# Patient Record
Sex: FEMALE | Race: WHITE | NOT HISPANIC OR LATINO | Employment: FULL TIME | URBAN - METROPOLITAN AREA
[De-identification: names, ages, dates, MRNs, and addresses within clinical notes are randomized per-mention and may not be internally consistent; named-entity substitution may affect disease eponyms.]

---

## 2018-03-06 ENCOUNTER — OFFICE VISIT (OUTPATIENT)
Dept: NEUROLOGY | Facility: CLINIC | Age: 48
End: 2018-03-06
Payer: COMMERCIAL

## 2018-03-06 VITALS
HEIGHT: 64 IN | SYSTOLIC BLOOD PRESSURE: 114 MMHG | BODY MASS INDEX: 25.56 KG/M2 | WEIGHT: 149.69 LBS | HEART RATE: 78 BPM | DIASTOLIC BLOOD PRESSURE: 74 MMHG

## 2018-03-06 DIAGNOSIS — R20.0 HEMISENSORY LOSS: ICD-10-CM

## 2018-03-06 DIAGNOSIS — F41.9 ANXIETY: ICD-10-CM

## 2018-03-06 DIAGNOSIS — Z92.89 HISTORY OF SENSORY CHANGES: Primary | ICD-10-CM

## 2018-03-06 PROCEDURE — 99205 OFFICE O/P NEW HI 60 MIN: CPT | Mod: S$GLB,,, | Performed by: PSYCHIATRY & NEUROLOGY

## 2018-03-06 PROCEDURE — 99999 PR PBB SHADOW E&M-NEW PATIENT-LVL III: CPT | Mod: PBBFAC,,, | Performed by: PSYCHIATRY & NEUROLOGY

## 2018-03-06 RX ORDER — ATORVASTATIN CALCIUM 10 MG/1
TABLET, FILM COATED ORAL
COMMUNITY
End: 2018-03-06

## 2018-03-06 RX ORDER — LEVETIRACETAM 500 MG/1
500 TABLET ORAL 2 TIMES DAILY
COMMUNITY
Start: 2017-12-12 | End: 2018-03-29 | Stop reason: SDUPTHER

## 2018-03-06 RX ORDER — ATORVASTATIN CALCIUM 20 MG/1
TABLET, FILM COATED ORAL
COMMUNITY
Start: 2018-02-25 | End: 2018-03-06 | Stop reason: ALTCHOICE

## 2018-03-06 RX ORDER — ALPRAZOLAM 0.5 MG/1
0.5 TABLET ORAL
COMMUNITY
Start: 2017-04-03 | End: 2020-12-08

## 2018-03-06 RX ORDER — ASPIRIN 81 MG/1
81 TABLET ORAL DAILY
COMMUNITY
End: 2018-03-06 | Stop reason: ALTCHOICE

## 2018-03-06 NOTE — ASSESSMENT & PLAN NOTE
-- no evidence of stroke on MRI, STOP aspirin and statin, no other indication for these  -- obtaining repeat EEG  -- taper off of keppra, no indication for AED at this time  -- may consider C spine imaging pending EEG results

## 2018-03-06 NOTE — PROGRESS NOTES
[facility] - NEUROLOGY EPILEPSY  [facility] [place]    Date: 3/6/18  Patient Name: Katherine Neely   MRN: [id number]   PCP: WESLEY GATES III (Inactive)  Referring Provider: Self, Aaareferral    Assessment:   Katherine Neely is a 48 y.o. female presenting with intermittent, nonstereotyped right-sided sensory loss.  The patient reports that she has carried diagnoses of MS and stroke in the past.  She is currently on prophylactic therapy for her reported history of stroke.  However, there is no evidence of stroke or multiple sclerosis on the patient's MRI.  Have recommenced discontinuation of aspirin and statin as there is no indication for this at present. Patient's MRI findings are common, benign T2 punctate abnormalities.  Patient has also notably had an LP with negative oligoclonal bands.  Additionally, the duration of the patient's right-sided sensory changes with no other associated deficits persisting over multiple days is highly unlikely to be consistent with focal onset seizure.      A questionable generalized discharge which appears artifactual in nature would not be consistent with a primary focal onset seizure disorder and would be unlikely to clinically manifested as a unilateral, negative sensory phenomenon. Will repeat in-house EEG and taper off of keppra prior to study. Would consider re-start of AED ONLY if EEG suggestive of focal interictal epileptiform activity.     Patient does endorse some correlation of her symptoms to anxiety but also exhibits a possible underlying right-sided cervical radiculopathy with a markedly positive Spurling's on the right. Pending EEG results, may consider MRI C Spine.   Plan:     Problem List Items Addressed This Visit        Psychiatric    Anxiety    Overview     Patient rarely uses xanax            Other    Hemisensory loss    Current Assessment & Plan     -- no evidence of stroke on MRI, STOP aspirin and statin, no other  indication for these  -- obtaining repeat EEG  -- taper off of keppra, no indication for AED at this time  -- may consider C spine imaging pending EEG results           Other Visit Diagnoses     History of sensory changes    -  Primary    Relevant Orders    EEG,w/awake & asleep record        Marko Alvarez MD  [facility]   Department of Neurology    Patient note was created using Dragon Dictation.  Any errors in syntax or even information may not have been identified and edited on initial review prior to signing this note.  Subjective:     HPI:   Ms. Katherine Neely is a 48 y.o. female who presents with a chief complaint of right sided sensory changes. The patient presents today with a friend who contributes to the history.  He states that beginning in May 2016, she experienced intermittent transient numbness of her right face and arm and occasionally sometimes into her leg.  She states that at first, the numbness lasted only minutes to hours at a time, but states that later progressed to being effectively persistent throughout the day.  She states that during this time, she occasionally experienced whole body numbness and states that while she did not experience paresthesias.  She often felt that she was not feeling things on the right side of her body as well as she did on the left.  She denied any associated weakness, vision change, coordination change, or any other associated focal neurologic deficits. She states that anxiety seems to provoke the sensory changes and notes that they disappear when she takes a xanax or is able to relax. She also notes intermittent muscle tightness and pain in the right side of her neck radiating down into her right arm. Spurling's is notably positive on the right during exam today.     The patient reports that she was seen by a neurologist on the [place], who did extensive testing and ultimately diagnosed her with a stroke, multiple sclerosis, and a seizure  disorder.  She provides extensive records to this and today which reveal negative testing for multiple sclerosis, an MRI brain in May 2017which revealed only a few punctate scattered T2 hyperintensities (personally reviewed along with stable MRI brain form 9/2017).  Despite this, she states she was told she had several strokes and she was placed on Plavix (she discontinued) as well as aspirin and statin therapy in addition to a full stroke risk stratification workup, which was otherwise WNL including carotid dopplers, EKG, and Echo.    She underwent an EEG which per report revealed a primary generalized epilepsy.  However, single page of the EEG in question that is provided in her records reveals only a single questionable, potentially artifactual discharge.  She was told her sensory changes were due to a focal seizure disorder and was started on keppra 2000 mg BID, which she states caused dizziness. She denies any other clinical seizure like activity or history of seizure and states she is only able to tolerate keppra 500 mg BID. Keppra has not changed the frequency or quality of her symptoms.    She also underwent EMG of her lower but not upper extremities .  In December, which was normal per report.  She has had extensive lab testing including a normal PITO, ESR, CRP, and CADASIL (patient paid out of pocket) in addition to hypercoagulable labs including cardiolipin, Lupus Anticoagulant, Protein C and S, ATIII, Factor V Leiden, which were all WNL.  She also underwent an LP which revealed bland CSF with 0 WBC, 9 RBC, Negative bands and IgG, Glucose 57, and Protein 24.     PAST MEDICAL HISTORY:  History reviewed. No pertinent past medical history.    PAST SURGICAL HISTORY:  History reviewed. No pertinent surgical history.    CURRENT MEDS:  Current Outpatient Prescriptions   Medication Sig Dispense Refill    ALPRAZolam (XANAX) 0.5 MG tablet Take 0.5 mg by mouth.      L. acidophilus-L. rhamnosus 15 billion cell Cap  "Take 1 capsule by mouth.      levETIRAcetam (KEPPRA) 500 MG Tab Take 1,000 mg by mouth.       No current facility-administered medications for this visit.        ALLERGIES:  Review of patient's allergies indicates:   Allergen Reactions    Sulfa (sulfonamide antibiotics) Rash       FAMILY HISTORY:  History reviewed. No pertinent family history.    SOCIAL HISTORY:  Social History   Substance Use Topics    Smoking status: Never Smoker    Smokeless tobacco: Not on file    Alcohol use Not on file       Review of Systems:  12 review of systems is negative except for the symptoms mentioned in HPI.      Objective:     Vitals:    03/06/18 1443   BP: 114/74   Pulse: 78   Weight: 67.9 kg (149 lb 11.1 oz)   Height: 5' 4" (1.626 m)     General: NAD, well nourished   Eyes: no tearing, discharge, no erythema   ENT: moist mucous membranes of the oral cavity, nares patent    Neck: Supple, full range of motion  Cardiovascular: Warm and well perfused, pulses equal and symmetrical  Lungs: Normal work of breathing, normal chest wall excursions  Skin: No rash, lesions, or breakdown on exposed skin  Psychiatry: Mood and affect are appropriate   Abdomen: soft, non tender, non distended  Extremeties: No cyanosis, clubbing or edema.    Neurological   MENTAL STATUS: Alert and oriented to person, place, and time. Attention and concentration within normal limits. Speech without dysarthria, able to name and repeat without difficulty. Recent and remote memory within normal limits   CRANIAL NERVES: Visual fields intact. PERRL. EOMI. Facial sensation intact. Face symmetrical. Hearing grossly intact. Full shoulder shrug bilaterally. Tongue protrudes midline   SENSORY: Sensation is intact to light touch throughout.  Joint position perception intact. Negative Romberg.   MOTOR: Normal bulk and tone.5/5 deltoid, biceps, triceps, interosseous, hand  bilaterally. 5/5 iliopsoas, knee extension/flexion, foot dorsi/plantarflexion bilaterally.  "   REFLEXES: Symmetric and 2+ throughout.   CEREBELLAR/COORDINATION/GAIT: Gait steady with normal arm swing and stride length.  Normal rapid alternating movements.

## 2018-03-06 NOTE — PATIENT INSTRUCTIONS
Stop aspirin  Stop lipitor immediately Baeza Hernandez  Three days prior to EEG, take 500 mg keppra at night ONLY  Two days prior to EEG, take 500 mg keppra at night ONLY  One day prior to EEG, STOP keppra. Do NOT take Xanax in day prior to EEG.  Electroencephalography (EEG)    Electroencephalography (EEG) is a test that measures your brain wave activity. It is used to assess your brain function. Brain cells (or neurons) communicate by producing electrical signals. These signals are measured by the EEG and any abnormalities are detected.  The EEG is safe and painless.  What is EEG used for?  Your doctor may order this test to check for seizures or other brain problems. For this test, several small metal disks (electrodes) are attached to the scalp with adhesives, or with water-based gel or paste. During the test, wavy lines (waveforms) appear on a screen or on paper. They will be studied to assess your brain function. In some people who are prone to seizures, parts of this test may slightly increase their chance of having a seizure. Sometimes it is necessary to repeat an EEG with sleep deprivation. EEG may be performed in a doctor's office or a hospital lab. The test typically takes less than an h our, although much of the time is spent attaching the electrodes. Sometimes, the electrodes are left on for several hours or days so that the EEG test can record brain waves for a longer periods of time. In these cases, you may need to stay in the hospital or can go home with a portable EEG recorder.  Before your test  Prepare for your test as instructed. Wash and dry your hair. But, don't use any hairstyling products. Your scalp and hair should be clean and free of excess oil. Take your routine medications, unless told not to. You may be asked to sleep during the EEG. To help you do this, you may be told to stay up all or part of the night before the test. Or, you may be given medication to help you sleep during the test.  If so, someone will need to drive you home after the test. Your test will take about 60 minutes. Arrive with enough time to check in.  My next appointment is:  ______________________________   For your safety and for the success of your test, tell the technologist about:  · Any medications or herbs you take  · Any seizures you may have had in the past   Date Last Reviewed: 10/19/2015  © 9963-6353 [employer]. [address] [place] [address]. All rights reserved. This information is not intended as a substitute for professional medical care. Always follow your healthcare professional's instructions.

## 2018-03-27 ENCOUNTER — HOSPITAL ENCOUNTER (OUTPATIENT)
Dept: NEUROLOGY | Facility: CLINIC | Age: 48
Discharge: HOME | End: 2018-03-27
Payer: COMMERCIAL

## 2018-03-27 DIAGNOSIS — Z92.89 HISTORY OF SENSORY CHANGES: ICD-10-CM

## 2018-03-27 PROCEDURE — 95819 PR EEG,W/AWAKE & ASLEEP RECORD: ICD-10-PCS | Mod: S$GLB,,, | Performed by: PSYCHIATRY & NEUROLOGY

## 2018-03-27 PROCEDURE — 95819 EEG AWAKE AND ASLEEP: CPT | Mod: S$GLB,,, | Performed by: PSYCHIATRY & NEUROLOGY

## 2018-03-28 NOTE — PROCEDURES
DATE OF SERVICE:  03/27/2018    EEG NUMBER:  OC-.    REFERRING PHYSICIAN:  Marko Alvarez M.D.    This EEG was performed to assess for evidence of underlying epilepsy.    ELECTROENCEPHALOGRAM REPORT    METHODOLOGY:  Electroencephalographic (EEG) recording is recorded with   electrodes placed according to the International 10-20 placement system.  Thirty   two (32) channels of digital signal (sampling rate of 512/sec), including T1   and T2, were simultaneously recorded from the scalp and may include EKG, EMG,   and/or eye monitors.  Recording band pass was 0.1 to 512 Hz.  Digital video   recording of the patient is simultaneously recorded with the EEG.  The patient   is instructed to report clinical symptoms which may occur during the recording   session.  EEG and video recording are stored and archived in digital format.    Activation procedures, which include photic stimulation, hyperventilation and   instructing patients to perform simple tasks, are done in selected patients.    The EEG is displayed on a monitor screen and can be reviewed using different   montages.  Computer assisted-analysis is employed to detect spike and   electrographic seizure activity.  The entire record is submitted for computer   analysis.  The entire recording is visually reviewed, and the times identified   by computer analysis as being spikes or seizures are reviewed again.    Compressed spectral analysis (CSA) is also performed on the activity recorded   from each individual channel.  This is displayed as a power display of   frequencies from 0 to 30 Hz over time.  The CSA is reviewed looking for   asymmetries in power between homologous areas of the scalp, then compared with   the original EEG recording.    [employer] software was also utilized in the review of this study.  This software   suite analyzes the EEG recording in multiple domains.  Coherence and rhythmicity   are computed to identify EEG sections which may contain  organized seizures.    Each channel undergoes analysis to detect the presence of spike and sharp waves   which have special and morphological characteristics of epileptic activity.  The   routine EEG recording is converted from special into frequency domain.  This is   then displayed comparing homologous areas to identify areas of significant   asymmetry.  Algorithm to identify non-cortically generated artifact is used to   separate artifact from the EEG.    EEG FINDINGS:  The recording was obtained with a number of standard bipolar and   referential montages during wakefulness, drowsiness and sleep.  In the alert   state, the posterior background rhythm was a symmetric, well-modulated 10 to 11   Hz alpha rhythm, which reacted symmetrically to eye opening.  Intermittent   photic stimulation evoked symmetric posterior driving responses.    Hyperventilation produced physiological slowing.  No abnormalities were   activated by photic stimulation or hyperventilation.  During drowsiness, the   background rhythm waxed and waned and there were periods of slowing.  During   stage II sleep, symmetric V waves and sleep spindles were noted.  The background   was punctuated by burst of poorly formed 2 to 3 Hz generalized spike wave   discharges, which occurred in burst of up to 3 seconds in duration.  The overall   burden of these discharges was less than 5% of the record.  No clinical or   electrographic seizures were recorded.    The EKG channel revealed sinus rhythm.    IMPRESSION:  This is an abnormal EEG during wakefulness, drowsiness and sleep.    Bursts of generalized 2 to 3 Hz spike wave discharges were noted.    CLINICAL CORRELATION:  The patient is a 48-year-old female who was previously   maintained on Keppra for presumed epilepsy.  This is an abnormal EEG during   wakefulness, drowsiness and sleep.  The presence of bursts of generalized spike   wave discharges is suggestive of an idiopathic generalized epilepsy  syndrome.    No seizures were recorded during this study.  The overall burden of the   epileptiform discharges is less than 5% of the record.      FAK/IN  dd: 03/27/2018 16:27:00 (CDT)  td: 03/27/2018 17:11:22 (CDT)  Doc ID   #[id number]  Job ID #[id number]    CC:

## 2018-03-29 RX ORDER — LEVETIRACETAM 500 MG/1
500 TABLET ORAL 2 TIMES DAILY
Qty: 60 TABLET | Refills: 11 | Status: SHIPPED | OUTPATIENT
Start: 2018-03-29 | End: 2020-11-05

## 2018-04-06 ENCOUNTER — TELEPHONE (OUTPATIENT)
Dept: NEUROLOGY | Facility: CLINIC | Age: 48
End: 2018-04-06

## 2018-04-06 RX ORDER — LAMOTRIGINE 25-50-100
KIT ORAL
Qty: 1 KIT | Refills: 0 | Status: SHIPPED | OUTPATIENT
Start: 2018-04-06 | End: 2018-04-19 | Stop reason: CLARIF

## 2018-04-06 NOTE — TELEPHONE ENCOUNTER
The patient wants to discuss another medication that is less strong than the Keppra, she wants to try something different.

## 2018-04-06 NOTE — TELEPHONE ENCOUNTER
----- Message from Nova Sparks sent at 4/6/2018  8:59 AM CDT -----  Contact: [phone]/self  Patient requesting to speak with you regarding having side effects of medication levETIRAcetam (KEPPRA) 500 MG Tab. Please advise.

## 2018-04-10 RX ORDER — LAMOTRIGINE 25-50-100
KIT ORAL
Qty: 1 KIT | Refills: 0 | OUTPATIENT
Start: 2018-04-10

## 2018-04-10 RX ORDER — LAMOTRIGINE 150 MG/1
150 TABLET ORAL DAILY
Qty: 30 TABLET | Refills: 11 | Status: SHIPPED | OUTPATIENT
Start: 2018-04-10 | End: 2018-04-19

## 2018-04-19 ENCOUNTER — OFFICE VISIT (OUTPATIENT)
Dept: NEUROLOGY | Facility: CLINIC | Age: 48
End: 2018-04-19
Payer: COMMERCIAL

## 2018-04-19 VITALS
SYSTOLIC BLOOD PRESSURE: 108 MMHG | HEART RATE: 76 BPM | DIASTOLIC BLOOD PRESSURE: 72 MMHG | HEIGHT: 64 IN | BODY MASS INDEX: 24.53 KG/M2 | WEIGHT: 143.69 LBS

## 2018-04-19 DIAGNOSIS — R45.89 ANXIETY ABOUT HEALTH: ICD-10-CM

## 2018-04-19 DIAGNOSIS — G40.309 NONINTRACTABLE GENERALIZED IDIOPATHIC EPILEPSY WITHOUT STATUS EPILEPTICUS: ICD-10-CM

## 2018-04-19 DIAGNOSIS — G40.309 GENERALIZED EPILEPSY: ICD-10-CM

## 2018-04-19 PROCEDURE — 99214 OFFICE O/P EST MOD 30 MIN: CPT | Mod: S$GLB,,, | Performed by: PSYCHIATRY & NEUROLOGY

## 2018-04-19 PROCEDURE — 99999 PR PBB SHADOW E&M-EST. PATIENT-LVL III: CPT | Mod: PBBFAC,,, | Performed by: PSYCHIATRY & NEUROLOGY

## 2018-04-19 RX ORDER — PYRIDOXINE HCL (VITAMIN B6) 100 MG
100 TABLET ORAL DAILY
Qty: 90 TABLET | Refills: 3 | Status: SHIPPED | OUTPATIENT
Start: 2018-04-19 | End: 2020-11-05

## 2018-04-19 NOTE — PROGRESS NOTES
[facility] - NEUROLOGY EPILEPSY  [facility] [place]    Date: 4/19/18  Patient Name: Katherine Neely   MRN: [id number]   PCP: WESLEY GATES III (Inactive)  Referring Provider: Self, Aaareferral    Assessment:   Katherine Neely is a 48 y.o. female follow-up for multiple neurologic complaints including intermittent sensory changes and vertigo.  Ultimately found to have markedly abnormal EEG with frequent generalized epileptiform discharges.  While correlation of epileptiform discharges and episodic sensory changes is not definite, patient has had a marked improvement in the sensation since resuming treatment with Keppra.  Have discussed importance of AED prophylaxis given her markedly abnormal EEG and high risk for seizure.  She expressed understanding of this.  Given side effects of irritability with Keppra will attempt trial of pyridoxine.  May consider lamictal as an alternative agent.     Problem List Items Addressed This Visit        Neuro    Nonintractable generalized idiopathic epilepsy without status epilepticus    Current Assessment & Plan     -- continue keppra 500 mg BID  -- start pyridoxine supplementation  -- favor lamictal if keppra not tolerated             Psychiatric    Anxiety about health      Other Visit Diagnoses     Generalized epilepsy            Marko Alvarez MD  [facility]   Department of Neurology    Patient note was created using Dragon Dictation.  Any errors in syntax or even information may not have been identified and edited on initial review prior to signing this note.  Subjective:     HPI:   Ms. Katherine Neely is a 48 y.o. female who presents with a chief complaint of right sided sensory changes.  Please see my previous note for details regarding the patient's initial presentation.  Since that her  who contributes to the history.  The patient's last visit, she is on EEG for persistent hemisensory changes which revealed frequent  "generalized epileptiform discharges consistent with generalized epilepsy.  The patient reports that since they started on Keppra which was resumed following her abnormal EEG, she has had reduced frequency of spells of decreased attentiveness, vertigo, and sensory changes (which remain unilateral).  She does endorse side effects of irritability with Keppra.  The states that she would rather try pyridoxine supplementation prior to attempting an alternative agent. She denies any new complaints today.     PAST MEDICAL HISTORY:  Past Medical History:   Diagnosis Date    Anxiety     Anxiety about health     Generalized epilepsy        PAST SURGICAL HISTORY:  History reviewed. No pertinent surgical history.    CURRENT MEDS:  Current Outpatient Prescriptions   Medication Sig Dispense Refill    L. acidophilus-L. rhamnosus 15 billion cell Cap Take 1 capsule by mouth.      levETIRAcetam (KEPPRA) 500 MG Tab Take 1 tablet (500 mg total) by mouth 2 (two) times daily. 60 tablet 11    ALPRAZolam (XANAX) 0.5 MG tablet Take 0.5 mg by mouth.      pyridoxine, vitamin B6, (B-6) 100 MG Tab Take 1 tablet (100 mg total) by mouth once daily. 90 tablet 3     No current facility-administered medications for this visit.        ALLERGIES:  Review of patient's allergies indicates:   Allergen Reactions    Sulfa (sulfonamide antibiotics) Rash       FAMILY HISTORY:  History reviewed. No pertinent family history.    SOCIAL HISTORY:  Social History   Substance Use Topics    Smoking status: Never Smoker    Smokeless tobacco: Not on file    Alcohol use Not on file       Review of Systems:  12 review of systems is negative except for the symptoms mentioned in HPI.      Objective:     Vitals:    04/19/18 1419   BP: 108/72   Pulse: 76   Weight: 65.2 kg (143 lb 11.2 oz)   Height: 5' 4" (1.626 m)     General: NAD, well nourished   Eyes: no tearing, discharge, no erythema   ENT: moist mucous membranes of the oral cavity, nares patent    Neck: " Supple, full range of motion  Cardiovascular: Warm and well perfused, pulses equal and symmetrical  Lungs: Normal work of breathing, normal chest wall excursions  Skin: No rash, lesions, or breakdown on exposed skin  Psychiatry: Mood and affect are appropriate   Abdomen: soft, non tender, non distended  Extremeties: No cyanosis, clubbing or edema.    Neurological   MENTAL STATUS: Alert and oriented to person, place, and time. Attention and concentration within normal limits. Speech without dysarthria, able to name and repeat without difficulty. Recent and remote memory within normal limits   CRANIAL NERVES: Visual fields intact. PERRL. EOMI. Facial sensation intact. Face symmetrical. Hearing grossly intact. Full shoulder shrug bilaterally. Tongue protrudes midline   SENSORY: Sensation is intact to light touch throughout.    MOTOR: Normal bulk and tone.5/5 deltoid, biceps, triceps, interosseous, hand  bilaterally. 5/5 iliopsoas, knee extension/flexion, foot dorsi/plantarflexion bilaterally.    REFLEXES: Symmetric and 2+ throughout.   CEREBELLAR/COORDINATION/GAIT: Gait steady with normal arm swing and stride length.  Normal rapid alternating movements.

## 2018-04-19 NOTE — PATIENT INSTRUCTIONS
First Aid: Seizures  A seizure results from a sudden rush of abnormal electrical signals in the brain. Symptoms may range from a minor daze to uncontrollable muscle spasms (convulsions). In many cases, the victim will lose consciousness. A seizure can be caused by a high fever, head injury, drug reaction, or condition such as epilepsy.  1. Protect the head  · Help the victim to the floor if he or she begins losing muscle control. Turn the person on his or her side to prevent choking.  · Protect the victim's head from injury by placing something soft, such as folded clothes, beneath it, and by moving objects away from the victim.  · Don't cause injury by restraining the person or by placing anything in his or her mouth.  · Remove eyeglasses.  2.  Preserve dignity  · Clear away bystanders.  · Reassure the victim, who may be confused, drowsy, or hostile when coming out of the seizure.  · Cover the person or provide dry clothes if muscle spasms have caused a loss of bladder control.  3. Check for injury  · Make sure the victim's mental state has returned to normal. One way to do this is to ask the person his or her name, the year, and your location.  · Injuries can occur to the head, mouth, tongue, or body.   4. Call 911  · If the seizure lasts longer than five minutes (Note: Timing the seizure and recovery time is helpful in many cases.)  · If a second seizure occurs  · If the victim doesnt regain consciousness  · If the victim is pregnant  · If the victim has no history of seizures  · If the person has sustained an injury during the seizure. (Note: If the injury is not severe or life threatening, it may be more appropriate to seek treatment with the primary care provider.)  Date Last Reviewed: 10/19/2015  © 7542-6465 [employer]. [address] [place] [address], [place] [address]. All rights reserved. This information is not intended as a substitute for professional medical care. Always follow your healthcare  professional's instructions.

## 2018-04-20 NOTE — ASSESSMENT & PLAN NOTE
-- continue keppra 500 mg BID  -- start pyridoxine supplementation  -- favor lamictal if keppra not tolerated

## 2018-09-13 ENCOUNTER — OFFICE VISIT (OUTPATIENT)
Dept: NEUROLOGY | Facility: CLINIC | Age: 48
End: 2018-09-13
Payer: COMMERCIAL

## 2018-09-13 VITALS
BODY MASS INDEX: 24.54 KG/M2 | DIASTOLIC BLOOD PRESSURE: 69 MMHG | SYSTOLIC BLOOD PRESSURE: 103 MMHG | HEIGHT: 64 IN | HEART RATE: 75 BPM | WEIGHT: 143.75 LBS

## 2018-09-13 DIAGNOSIS — F41.9 ANXIETY: ICD-10-CM

## 2018-09-13 DIAGNOSIS — G40.309 NONINTRACTABLE GENERALIZED IDIOPATHIC EPILEPSY WITHOUT STATUS EPILEPTICUS: ICD-10-CM

## 2018-09-13 PROCEDURE — 3008F BODY MASS INDEX DOCD: CPT | Mod: CPTII,S$GLB,, | Performed by: PSYCHIATRY & NEUROLOGY

## 2018-09-13 PROCEDURE — 99999 PR PBB SHADOW E&M-EST. PATIENT-LVL III: CPT | Mod: PBBFAC,,, | Performed by: PSYCHIATRY & NEUROLOGY

## 2018-09-13 PROCEDURE — 99214 OFFICE O/P EST MOD 30 MIN: CPT | Mod: S$GLB,,, | Performed by: PSYCHIATRY & NEUROLOGY

## 2018-09-13 RX ORDER — LAMOTRIGINE 25(42)-100
KIT ORAL
Qty: 1 EACH | Refills: 0 | Status: SHIPPED | OUTPATIENT
Start: 2018-09-13 | End: 2018-09-21

## 2018-09-13 NOTE — PROGRESS NOTES
[facility] NEUROLOGY  [facility] [place]    Date: 9/13/18  Patient Name: Katherine Neely   MRN: [id number]   PCP: WESLEY GATES III (Inactive)  Referring Provider: No ref. provider found    Assessment/Plan:   After extensive discussions regarding potential therapeutic options including discussion of possible vagal nerve stimulator (with to the patient is not interested in pursuing at this time).  The patient and her aunt had numerous questions about the utility of medical marijuana/CBD oral which I informed them that I am unable to legally prescribed and would not recommend as a second-line therapy in her case at this time.  We also discussed the patient opting not to seek any form of treatment which the patient initially wanted to consider.  We discussed the risk seizure propagation, impact on cognition, and the potential for SUDEP.   They expressed understanding of this.  We reviewed alternative antiepileptic medications the patient has ultimately  agreed to a trial of Lamictal.  We will plan for discontinuation of Keppra once patient has reached therapeutic level of Lamictal.     Problem List Items Addressed This Visit        Neuro    Nonintractable generalized idiopathic epilepsy without status epilepticus    Current Assessment & Plan     -- initiating lamictal, initial goal 200 mg BID  -- will d/c keppra when therapeutic on lamictal            Psychiatric    Anxiety    Overview     Patient rarely uses xanax         Current Assessment & Plan     -- remains precontemplative about seeking therapy             I spent a total of 29 minutes in face to face time with the patient, over half of which was spent on counseling and education about the patient's diagnosis and medications.     Marko Alvarez MD  [facility]   Department of Neurology    Patient note was created using Dragon Dictation.  Any errors in syntax or even information may not have been identified and edited on initial  review prior to signing this note.  Subjective:     HPI:   Ms. Katherine Neely is a 48 y.o. female presenting in follow-up for primary generalized epilepsy.  She presents today with her anti contributes to the history.  The patient reports that since her last visit she has been experiencing side effects of irritability, fatigue, and dizziness well on Keppra.  She wishes to discontinue this medication.  She inquires today about medical marijuana stating that she is primarily interested in natural remedies only for management of her epilepsy.  She is tearful throughout much of the visit and admits that she is under quite a bit of stress issues at home under.  She states but preferred not to take medication or undergo counseling for it.  Her aunt is in agreement that her anxiety often colors her perception of medication and side effects. She does states that her stereotyped sensory episodes have resolved with even low-dose Keppra.  She states that she lives in perpetual fear may be a sign or symptom of an impending seizure.    Seizure Type: Primary generalized  Seizure Etiology: Cryptogenic  Current AEDs:  Keppra 500 mg BID    The patient is accompanied by family who contribute to the history. This patient has 1 types of seizure as described below. The patient reports having seizures for years. The patient reports to have improving seizure control. The seizure frequency is rarely. The last seizure was 3/2018. The patient reports side effects from seizure medication.     Seizure Type 1:   Seizure Description: Right sided sensory loss, occasionally whole body  Aura: None  Associated Symptoms: None  Seizure Frequency: R angela  Last seizure: 3/2018    Seizure Triggers/ Provoking Features: Anxiety   Seizure Onset Age: 46  Seizure/ Epilepsy Risk Factors: None known  Birth/Developmental History: Normal birth and developmental history  Previous Seizure Medications: LEV (irritability, dizziness)  Other Treatments:  "None  Episodes of Status:  None  Recent Med Changes: None  Psychiatric/Behavioral Comorbitidies: Anxiety  Surgical Candidacy:  N/A    Prior Studies:  EEG : 3/2018- primary generalized 2-3 Hz s/w discharges  vEEG/ EMU evaluation:   MRI of brain: 5/2017- Nonspecific white matter changes  Other studies:  Not done  AED levels: Not done  CT/CTA Scan: Not done  PET Scan: Not done  Neuropsychological Evaluation: Not done  DEXA Scan: Not done    PAST MEDICAL HISTORY:  Past Medical History:   Diagnosis Date    Anxiety     Anxiety about health     Generalized epilepsy        PAST SURGICAL HISTORY:  History reviewed. No pertinent surgical history.    CURRENT MEDS:  Current Outpatient Medications   Medication Sig Dispense Refill    L. acidophilus-L. rhamnosus 15 billion cell Cap Take 1 capsule by mouth.      lamoTRIgine (LAMICTAL STARTER, ORANGE, KIT) 25 mg (42) -100 mg (7) DsPk Follow package instrutions 1 each 0    levETIRAcetam (KEPPRA) 500 MG Tab Take 1 tablet (500 mg total) by mouth 2 (two) times daily. 60 tablet 11    pyridoxine, vitamin B6, (B-6) 100 MG Tab Take 1 tablet (100 mg total) by mouth once daily. 90 tablet 3    ALPRAZolam (XANAX) 0.5 MG tablet Take 0.5 mg by mouth.       No current facility-administered medications for this visit.        ALLERGIES:  Review of patient's allergies indicates:   Allergen Reactions    Sulfa (sulfonamide antibiotics) Rash       FAMILY HISTORY:  History reviewed. No pertinent family history.    SOCIAL HISTORY:  Social History     Tobacco Use    Smoking status: Never Smoker   Substance Use Topics    Alcohol use: Not on file    Drug use: Not on file       Review of Systems:  12 review of systems is negative except for the symptoms mentioned in HPI.      Objective:     Vitals:    09/13/18 1105   BP: 103/69   Pulse: 75   Weight: 65.2 kg (143 lb 11.8 oz)   Height: 5' 4" (1.626 m)     General: NAD, well nourished   Eyes: no tearing, discharge, no erythema   ENT: moist mucous " membranes of the oral cavity, nares patent    Neck: Supple, full range of motion  Cardiovascular: Warm and well perfused, pulses equal and symmetrical  Lungs: Normal work of breathing, normal chest wall excursions  Skin: No rash, lesions, or breakdown on exposed skin  Psychiatry: Mood and affect are appropriate   Abdomen: soft, non tender, non distended  Extremeties: No cyanosis, clubbing or edema.    Neurological   MENTAL STATUS: Alert and oriented to person, place, and time. Attention and concentration within normal limits. Speech without dysarthria, able to name and repeat without difficulty.  CRANIAL NERVES: Visual fields intact. PERRL. EOMI. Facial sensation intact. Face symmetrical. Hearing grossly intact. Full shoulder shrug bilaterally. Tongue protrudes midline   SENSORY: Sensation is intact to light touch throughout.    MOTOR: Normal bulk and tone. 5/5 strength throughout.   REFLEXES: Symmetric and 1+ throughout.   CEREBELLAR/COORDINATION/GAIT: Gait steady with normal arm swing and stride length. Finger to nose intact.

## 2018-09-13 NOTE — PATIENT INSTRUCTIONS
First Aid: Seizures  A seizure results from a sudden rush of abnormal electrical signals in the brain. Symptoms may range from a minor daze to uncontrollable muscle spasms (convulsions). In many cases, the victim will lose consciousness. A seizure can be caused by a high fever, head injury, drug reaction, or condition such as epilepsy.  1. Protect the head  · Help the victim to the floor if he or she begins losing muscle control. Turn the person on his or her side to prevent choking.  · Protect the victim's head from injury by placing something soft, such as folded clothes, beneath it, and by moving objects away from the victim.  · Don't cause injury by restraining the person or by placing anything in his or her mouth.  · Remove eyeglasses.  2.  Preserve dignity  · Clear away bystanders.  · Reassure the victim, who may be confused, drowsy, or hostile when coming out of the seizure.  · Cover the person or provide dry clothes if muscle spasms have caused a loss of bladder control.  3. Check for injury  · Make sure the victim's mental state has returned to normal. One way to do this is to ask the person his or her name, the year, and your location.  · Injuries can occur to the head, mouth, tongue, or body.   4. Call 911  · If the seizure lasts longer than five minutes (Note: Timing the seizure and recovery time is helpful in many cases.)  · If a second seizure occurs  · If the victim doesnt regain consciousness  · If the victim is pregnant  · If the victim has no history of seizures  · If the person has sustained an injury during the seizure. (Note: If the injury is not severe or life threatening, it may be more appropriate to seek treatment with the primary care provider.)  Date Last Reviewed: 10/19/2015  © 8608-0169 [employer]. [address] [place] [address], [place] [address]. All rights reserved. This information is not intended as a substitute for professional medical care. Always follow your healthcare  professional's instructions.

## 2018-09-21 RX ORDER — LAMOTRIGINE 25 MG/1
TABLET ORAL
Qty: 98 TABLET | Refills: 0 | Status: SHIPPED | OUTPATIENT
Start: 2018-09-21 | End: 2018-10-18 | Stop reason: SDUPTHER

## 2018-10-18 RX ORDER — LAMOTRIGINE 100 MG/1
100 TABLET ORAL DAILY
Qty: 90 TABLET | Refills: 3 | Status: SHIPPED | OUTPATIENT
Start: 2018-10-18 | End: 2019-12-02 | Stop reason: SDUPTHER

## 2018-10-18 RX ORDER — LAMOTRIGINE 25 MG/1
TABLET ORAL
Qty: 98 TABLET | Refills: 0 | OUTPATIENT
Start: 2018-10-18

## 2018-11-13 ENCOUNTER — PATIENT MESSAGE (OUTPATIENT)
Dept: NEUROLOGY | Facility: CLINIC | Age: 48
End: 2018-11-13

## 2019-12-03 RX ORDER — LAMOTRIGINE 100 MG/1
100 TABLET ORAL DAILY
Qty: 90 TABLET | Refills: 0 | Status: SHIPPED | OUTPATIENT
Start: 2019-12-03 | End: 2020-03-28

## 2020-02-26 RX ORDER — LAMOTRIGINE 100 MG/1
TABLET ORAL
Qty: 90 TABLET | Refills: 0 | OUTPATIENT
Start: 2020-02-26

## 2020-03-11 ENCOUNTER — HOSPITAL ENCOUNTER (OUTPATIENT)
Dept: CARDIOLOGY | Facility: HOSPITAL | Age: 50
Discharge: HOME | End: 2020-03-11
Attending: INTERNAL MEDICINE
Payer: COMMERCIAL

## 2020-03-11 ENCOUNTER — OFFICE VISIT (OUTPATIENT)
Dept: CARDIOLOGY | Facility: CLINIC | Age: 50
End: 2020-03-11
Payer: COMMERCIAL

## 2020-03-11 VITALS
SYSTOLIC BLOOD PRESSURE: 110 MMHG | HEART RATE: 68 BPM | HEIGHT: 64 IN | DIASTOLIC BLOOD PRESSURE: 56 MMHG | OXYGEN SATURATION: 98 % | WEIGHT: 150 LBS | BODY MASS INDEX: 25.61 KG/M2

## 2020-03-11 DIAGNOSIS — Z82.49 FAMILY HISTORY OF ARTERIOSCLEROTIC CARDIOVASCULAR DISEASE: ICD-10-CM

## 2020-03-11 DIAGNOSIS — R07.9 CHEST PAIN, MODERATE CORONARY ARTERY RISK: ICD-10-CM

## 2020-03-11 DIAGNOSIS — Z76.89 ESTABLISHING CARE WITH NEW DOCTOR, ENCOUNTER FOR: ICD-10-CM

## 2020-03-11 DIAGNOSIS — F41.9 ANXIETY: Primary | ICD-10-CM

## 2020-03-11 DIAGNOSIS — Z76.89 ESTABLISHING CARE WITH NEW DOCTOR, ENCOUNTER FOR: Primary | ICD-10-CM

## 2020-03-11 PROCEDURE — 3008F BODY MASS INDEX DOCD: CPT | Mod: CPTII,S$GLB,, | Performed by: INTERNAL MEDICINE

## 2020-03-11 PROCEDURE — 93010 ELECTROCARDIOGRAM REPORT: CPT | Mod: ,,, | Performed by: INTERNAL MEDICINE

## 2020-03-11 PROCEDURE — 99999 PR PBB SHADOW E&M-EST. PATIENT-LVL III: ICD-10-PCS | Mod: PBBFAC,,, | Performed by: INTERNAL MEDICINE

## 2020-03-11 PROCEDURE — 93005 ELECTROCARDIOGRAM TRACING: CPT | Mod: PO

## 2020-03-11 PROCEDURE — 99205 OFFICE O/P NEW HI 60 MIN: CPT | Mod: S$GLB,,, | Performed by: INTERNAL MEDICINE

## 2020-03-11 PROCEDURE — 99205 PR OFFICE/OUTPT VISIT, NEW, LEVL V, 60-74 MIN: ICD-10-PCS | Mod: S$GLB,,, | Performed by: INTERNAL MEDICINE

## 2020-03-11 PROCEDURE — 99999 PR PBB SHADOW E&M-EST. PATIENT-LVL III: CPT | Mod: PBBFAC,,, | Performed by: INTERNAL MEDICINE

## 2020-03-11 PROCEDURE — 93010 EKG 12-LEAD: ICD-10-PCS | Mod: ,,, | Performed by: INTERNAL MEDICINE

## 2020-03-11 PROCEDURE — 3008F PR BODY MASS INDEX (BMI) DOCUMENTED: ICD-10-PCS | Mod: CPTII,S$GLB,, | Performed by: INTERNAL MEDICINE

## 2020-03-11 NOTE — PROGRESS NOTES
Subjective:   Patient ID:  Katherine Neely is a 50 y.o. female who presents for follow-up of No chief complaint on file.  Pt with nonspecific CP- at rest or exertion. Pt denies other sx.    CRF- family hx of CAD, lack of exercise    Chest Pain    This is a recurrent problem. The current episode started 1 to 4 weeks ago. The problem occurs intermittently. The problem has been waxing and waning. The pain is present in the lateral region. The pain is mild. The quality of the pain is described as dull. The pain does not radiate. Pertinent negatives include no dizziness, palpitations or shortness of breath. The pain is aggravated by nothing. She has tried rest for the symptoms. The treatment provided moderate relief.   Pertinent negatives for past medical history include no muscle weakness.       Review of Systems   Constitution: Negative. Negative for weight gain.   HENT: Negative.    Eyes: Negative.    Cardiovascular: Positive for chest pain. Negative for leg swelling and palpitations.   Respiratory: Negative.  Negative for shortness of breath.    Endocrine: Negative.    Hematologic/Lymphatic: Negative.    Skin: Negative.    Musculoskeletal: Negative for muscle weakness.   Gastrointestinal: Negative.    Genitourinary: Negative.    Neurological: Negative.  Negative for dizziness.   Psychiatric/Behavioral: Negative.    Allergic/Immunologic: Negative.      History reviewed. No pertinent family history.  Past Medical History:   Diagnosis Date    Anxiety     Anxiety about health     Generalized epilepsy      Social History     Socioeconomic History    Marital status:      Spouse name: Not on file    Number of children: Not on file    Years of education: Not on file    Highest education level: Not on file   Occupational History    Not on file   Social Needs    Financial resource strain: Not on file    Food insecurity:     Worry: Not on file     Inability: Not on file    Transportation needs:     Medical: Not  on file     Non-medical: Not on file   Tobacco Use    Smoking status: Never Smoker   Substance and Sexual Activity    Alcohol use: Yes     Frequency: Never    Drug use: Not on file    Sexual activity: Not on file   Lifestyle    Physical activity:     Days per week: Not on file     Minutes per session: Not on file    Stress: Not on file   Relationships    Social connections:     Talks on phone: Not on file     Gets together: Not on file     Attends Confucianist service: Not on file     Active member of club or organization: Not on file     Attends meetings of clubs or organizations: Not on file     Relationship status: Not on file   Other Topics Concern    Not on file   Social History Narrative    Not on file     Current Outpatient Medications on File Prior to Visit   Medication Sig Dispense Refill    ALPRAZolam (XANAX) 0.5 MG tablet Take 0.5 mg by mouth.      lamoTRIgine (LAMICTAL) 100 MG tablet Take 1 tablet (100 mg total) by mouth once daily. 90 tablet 0    L. acidophilus-L. rhamnosus 15 billion cell Cap Take 1 capsule by mouth.      levETIRAcetam (KEPPRA) 500 MG Tab Take 1 tablet (500 mg total) by mouth 2 (two) times daily. 60 tablet 11    pyridoxine, vitamin B6, (B-6) 100 MG Tab Take 1 tablet (100 mg total) by mouth once daily. 90 tablet 3     No current facility-administered medications on file prior to visit.      Review of patient's allergies indicates:   Allergen Reactions    Sulfa (sulfonamide antibiotics) Rash       Objective:     Physical Exam   Constitutional: She is oriented to person, place, and time. She appears well-developed and well-nourished.   HENT:   Head: Normocephalic and atraumatic.   Eyes: Pupils are equal, round, and reactive to light. Conjunctivae and EOM are normal.   Neck: Normal range of motion. Neck supple.   Cardiovascular: Normal rate, regular rhythm, normal heart sounds and intact distal pulses.   Pulmonary/Chest: Effort normal and breath sounds normal.   Abdominal:  Soft. Bowel sounds are normal.   Musculoskeletal: Normal range of motion.   Neurological: She is alert and oriented to person, place, and time.   Skin: Skin is warm and dry.   Psychiatric: She has a normal mood and affect.   Nursing note and vitals reviewed.      Assessment:     1. Anxiety    2. Chest pain, moderate coronary artery risk    3. Hx of HLP  4. laCK OF EXERCISE    Plan:     Anxiety    Chest pain, moderate coronary artery risk    STRESS ECHO

## 2020-03-25 ENCOUNTER — PATIENT MESSAGE (OUTPATIENT)
Dept: CARDIOLOGY | Facility: HOSPITAL | Age: 50
End: 2020-03-25

## 2020-03-28 RX ORDER — LAMOTRIGINE 100 MG/1
TABLET ORAL
Qty: 90 TABLET | Refills: 0 | Status: SHIPPED | OUTPATIENT
Start: 2020-03-28 | End: 2020-07-09

## 2020-04-13 ENCOUNTER — TELEPHONE (OUTPATIENT)
Dept: CARDIOLOGY | Facility: CLINIC | Age: 50
End: 2020-04-13

## 2020-04-13 NOTE — TELEPHONE ENCOUNTER
The patient called and cancelled test today.Stated she wasn't feeling well had a cold and needed to stay home.She will contact office to reschedule.

## 2020-07-06 ENCOUNTER — TELEPHONE (OUTPATIENT)
Dept: CARDIOLOGY | Facility: HOSPITAL | Age: 50
End: 2020-07-06

## 2020-07-06 NOTE — TELEPHONE ENCOUNTER
The patient was called and notified her referral had to be placed again since the previous one was ,to please call office before coming in to make sure her referral was cleared for her test,otherwise we may have to reschedule.She agreed.

## 2020-07-07 ENCOUNTER — HOSPITAL ENCOUNTER (OUTPATIENT)
Dept: CARDIOLOGY | Facility: HOSPITAL | Age: 50
Discharge: HOME | End: 2020-07-07
Attending: INTERNAL MEDICINE
Payer: COMMERCIAL

## 2020-07-07 ENCOUNTER — TELEPHONE (OUTPATIENT)
Dept: CARDIOLOGY | Facility: HOSPITAL | Age: 50
End: 2020-07-07

## 2020-07-07 VITALS
HEART RATE: 82 BPM | BODY MASS INDEX: 25.61 KG/M2 | SYSTOLIC BLOOD PRESSURE: 118 MMHG | DIASTOLIC BLOOD PRESSURE: 76 MMHG | HEIGHT: 64 IN | WEIGHT: 150 LBS

## 2020-07-07 DIAGNOSIS — R07.9 CHEST PAIN, MODERATE CORONARY ARTERY RISK: Primary | ICD-10-CM

## 2020-07-07 DIAGNOSIS — R94.31 NONSPECIFIC ABNORMAL ELECTROCARDIOGRAM (ECG) (EKG): ICD-10-CM

## 2020-07-07 DIAGNOSIS — R07.9 CHEST PAIN, MODERATE CORONARY ARTERY RISK: ICD-10-CM

## 2020-07-07 LAB
ASCENDING AORTA: 2.54 CM
AV INDEX (PROSTH): 0.81
AV LVOT PEAK GRADIENT: 6 MMHG
AV MEAN GRADIENT: 6 MMHG
AV PEAK GRADIENT: 11 MMHG
AV VALVE AREA: 2.58 CM2
AV VELOCITY RATIO: 0.74
BSA FOR ECHO PROCEDURE: 1.75 M2
CV ECHO LV RWT: 0.48 CM
DOP CALC AO PEAK VEL: 1.63 M/S
DOP CALC AO VTI: 33.33 CM
DOP CALC LVOT AREA: 3.2 CM2
DOP CALC LVOT DIAMETER: 2.02 CM
DOP CALC LVOT PEAK VEL: 1.21 M/S
DOP CALC LVOT STROKE VOLUME: 85.97 CM3
DOP CALCLVOT PEAK VEL VTI: 26.84 CM
E WAVE DECELERATION TIME: 214.89 MS
E/A RATIO: 1.31
E/E' RATIO: 3.78 M/S
ECHO EF ESTIMATED: 61 %
ECHO LV POSTERIOR WALL: 1.1 CM (ref 0.6–1.1)
FRACTIONAL SHORTENING: 32 % (ref 28–44)
INTERVENTRICULAR SEPTUM: 0.81 CM (ref 0.6–1.1)
IVRT: 103.81 MS
LA MAJOR: 4.3 CM
LA MINOR: 3.7 CM
LA WIDTH: 3.4 CM
LEFT ATRIUM SIZE: 3.09 CM
LEFT ATRIUM VOLUME INDEX: 20.5 ML/M2
LEFT ATRIUM VOLUME: 35.52 CM3
LEFT INTERNAL DIMENSION IN SYSTOLE: 3.14 CM (ref 2.1–4)
LEFT VENTRICLE DIASTOLIC VOLUME INDEX: 57.2 ML/M2
LEFT VENTRICLE DIASTOLIC VOLUME: 99 ML
LEFT VENTRICLE MASS INDEX: 87 G/M2
LEFT VENTRICLE SYSTOLIC VOLUME INDEX: 22.5 ML/M2
LEFT VENTRICLE SYSTOLIC VOLUME: 39 ML
LEFT VENTRICULAR INTERNAL DIMENSION IN DIASTOLE: 4.63 CM (ref 3.5–6)
LEFT VENTRICULAR MASS: 150.75 G
LV LATERAL E/E' RATIO: 3.58 M/S
LV SEPTAL E/E' RATIO: 4 M/S
MV PEAK A VEL: 0.52 M/S
MV PEAK E VEL: 0.68 M/S
MV STENOSIS PRESSURE HALF TIME: 62 MS
MV VALVE AREA P 1/2 METHOD: 3.55 CM2
PROX AORTA: 3.41 CM
PULM VEIN A" WAVE DURATION": 58.82 MS
PULM VEIN S/D RATIO: 1.1
PV PEAK D VEL: 0.62 M/S
PV PEAK S VEL: 0.68 M/S
RA MAJOR: 3.8 CM
RA PRESSURE: 3 MMHG
RA WIDTH: 2.3 CM
RIGHT VENTRICULAR END-DIASTOLIC DIMENSION: 2.15 CM
SINUS: 3 CM
STJ: 3.1 CM
TDI LATERAL: 0.19 M/S
TDI SEPTAL: 0.17 M/S
TDI: 0.18 M/S
TRICUSPID ANNULAR PLANE SYSTOLIC EXCURSION: 2.4 CM

## 2020-07-07 PROCEDURE — 93306 ECHO (CUPID ONLY): ICD-10-PCS | Mod: 26,,, | Performed by: INTERNAL MEDICINE

## 2020-07-07 PROCEDURE — 93306 TTE W/DOPPLER COMPLETE: CPT | Mod: PO

## 2020-07-07 PROCEDURE — 93306 TTE W/DOPPLER COMPLETE: CPT | Mod: 26,,, | Performed by: INTERNAL MEDICINE

## 2020-07-07 NOTE — TELEPHONE ENCOUNTER
The patient is for a stress echo.Resting EKG reviewed by Dr Espinosa.Felt EKG abnormalities will interfere with stress readings so advised [facility] nuclear.Spoke with patient she had cath over 5 years ago which was negative per patient and done due to chest pain and abnormal EKG and family history.Patient agreed after speaking with Dr Espinosa to have Nuclear test.Scheduled and instructions given.

## 2020-07-20 ENCOUNTER — HOSPITAL ENCOUNTER (OUTPATIENT)
Dept: CARDIOLOGY | Facility: HOSPITAL | Age: 50
Discharge: HOME | End: 2020-07-20
Attending: INTERNAL MEDICINE
Payer: COMMERCIAL

## 2020-07-20 VITALS — HEIGHT: 64 IN | WEIGHT: 150 LBS | BODY MASS INDEX: 25.61 KG/M2

## 2020-07-20 DIAGNOSIS — R94.31 NONSPECIFIC ABNORMAL ELECTROCARDIOGRAM (ECG) (EKG): ICD-10-CM

## 2020-07-20 DIAGNOSIS — R07.9 CHEST PAIN, MODERATE CORONARY ARTERY RISK: ICD-10-CM

## 2020-07-20 PROCEDURE — A9500 TC99M SESTAMIBI: HCPCS | Mod: PO

## 2020-07-20 PROCEDURE — 78452 HT MUSCLE IMAGE SPECT MULT: CPT | Mod: 26,,, | Performed by: INTERNAL MEDICINE

## 2020-07-20 PROCEDURE — 93018 CV STRESS TEST I&R ONLY: CPT | Mod: ,,, | Performed by: INTERNAL MEDICINE

## 2020-07-20 PROCEDURE — 93016 NUCLEAR STRESS - 3RD PARTY (CUPID ONLY): ICD-10-PCS | Mod: ,,, | Performed by: INTERNAL MEDICINE

## 2020-07-20 PROCEDURE — 78452 NUCLEAR STRESS - 3RD PARTY (CUPID ONLY): ICD-10-PCS | Mod: 26,,, | Performed by: INTERNAL MEDICINE

## 2020-07-20 PROCEDURE — 93016 CV STRESS TEST SUPVJ ONLY: CPT | Mod: ,,, | Performed by: INTERNAL MEDICINE

## 2020-07-20 PROCEDURE — 93018 NUCLEAR STRESS - 3RD PARTY (CUPID ONLY): ICD-10-PCS | Mod: ,,, | Performed by: INTERNAL MEDICINE

## 2020-07-20 RX ORDER — REGADENOSON 0.08 MG/ML
0.4 INJECTION, SOLUTION INTRAVENOUS ONCE
Status: COMPLETED | OUTPATIENT
Start: 2020-07-20 | End: 2020-07-20

## 2020-07-20 RX ADMIN — REGADENOSON 0.4 MG: 0.08 INJECTION, SOLUTION INTRAVENOUS at 10:07

## 2020-07-21 ENCOUNTER — TELEPHONE (OUTPATIENT)
Dept: CARDIOLOGY | Facility: CLINIC | Age: 50
End: 2020-07-21

## 2020-07-21 LAB
CV PHARM DOSE: 0.4 MG
CV STRESS BASE HR: 62 BPM
DIASTOLIC BLOOD PRESSURE: 67 MMHG
NUC REST EJECTION FRACTION: 72
NUC STRESS EJECTION FRACTION: 72 %
OHS CV CPX 85 PERCENT MAX PREDICTED HEART RATE MALE: 139
OHS CV CPX ESTIMATED METS: 1
OHS CV CPX MAX PREDICTED HEART RATE: 164
OHS CV CPX PATIENT IS FEMALE: 1
OHS CV CPX PATIENT IS MALE: 0
OHS CV CPX PEAK DIASTOLIC BLOOD PRESSURE: 74 MMHG
OHS CV CPX PEAK HEAR RATE: 122 BPM
OHS CV CPX PEAK RATE PRESSURE PRODUCT: NORMAL
OHS CV CPX PEAK SYSTOLIC BLOOD PRESSURE: 110 MMHG
OHS CV CPX PERCENT MAX PREDICTED HEART RATE ACHIEVED: 74
OHS CV CPX RATE PRESSURE PRODUCT PRESENTING: 6448
OHS CV PHARM TIME: 1 MIN
STRESS ECHO POST EXERCISE DUR MIN: 2 MINUTES
STRESS ECHO POST EXERCISE DUR SEC: 0 SECONDS
SYSTOLIC BLOOD PRESSURE: 104 MMHG

## 2020-07-30 ENCOUNTER — OFFICE VISIT (OUTPATIENT)
Dept: CARDIOLOGY | Facility: CLINIC | Age: 50
End: 2020-07-30
Payer: COMMERCIAL

## 2020-07-30 DIAGNOSIS — F41.9 ANXIETY: ICD-10-CM

## 2020-07-30 DIAGNOSIS — Z82.49 FAMILY HISTORY OF ARTERIOSCLEROTIC CARDIOVASCULAR DISEASE: ICD-10-CM

## 2020-07-30 DIAGNOSIS — R07.9 CHEST PAIN, MODERATE CORONARY ARTERY RISK: Primary | ICD-10-CM

## 2020-07-30 PROCEDURE — 99214 PR OFFICE/OUTPT VISIT, EST, LEVL IV, 30-39 MIN: ICD-10-PCS | Mod: 95,,, | Performed by: INTERNAL MEDICINE

## 2020-07-30 PROCEDURE — 99214 OFFICE O/P EST MOD 30 MIN: CPT | Mod: 95,,, | Performed by: INTERNAL MEDICINE

## 2020-07-30 NOTE — PROGRESS NOTES
Subjective:   Patient ID:  Katherine Neely is a 50 y.o. female who presents for follow-up of No chief complaint on file.  The patient location is: home  The chief complaint leading to consultation is: CP    Visit type: audiovisual    Face to Face time with patient: 15 minutes  30 minutes minutes of total time spent on the encounter, which includes face to face time and non-face to face time preparing to see the patient (eg, review of tests), Obtaining and/or reviewing separately obtained history, Documenting clinical information in the electronic or other health record, Independently interpreting results (not separately reported) and communicating results to the patient/family/caregiver, or Care coordination (not separately reported).         Each patient to whom he or she provides medical services by telemedicine is:  (1) informed of the relationship between the physician and patient and the respective role of any other health care provider with respect to management of the patient; and (2) notified that he or she may decline to receive medical services by telemedicine and may withdraw from such care at any time.    Notes:   NMT and echo normal  Pt with atypical CP.  Cath years ago normal.   EGD esophageal spasm in the past.    Chest Pain   This is a chronic problem. The current episode started more than 1 month ago. The problem occurs rarely. The problem has been rapidly improving. The pain is present in the substernal region. The pain is mild. The quality of the pain is described as dull. The pain does not radiate. Pertinent negatives include no dizziness, palpitations or shortness of breath. The pain is aggravated by nothing. She has tried rest for the symptoms. The treatment provided moderate relief.   Pertinent negatives for past medical history include no muscle weakness. Prior diagnostic workup includes stress thallium.       Review of Systems   Constitution: Negative. Negative for weight gain.   HENT:  Negative.    Eyes: Negative.    Cardiovascular: Positive for chest pain. Negative for leg swelling and palpitations.   Respiratory: Negative.  Negative for shortness of breath.    Endocrine: Negative.    Hematologic/Lymphatic: Negative.    Skin: Negative.    Musculoskeletal: Negative for muscle weakness.   Gastrointestinal: Negative.    Genitourinary: Negative.    Neurological: Negative.  Negative for dizziness.   Psychiatric/Behavioral: Negative.    Allergic/Immunologic: Negative.      No family history on file.  Past Medical History:   Diagnosis Date    Anxiety     Anxiety about health     Generalized epilepsy      Social History     Socioeconomic History    Marital status:      Spouse name: Not on file    Number of children: Not on file    Years of education: Not on file    Highest education level: Not on file   Occupational History    Not on file   Social Needs    Financial resource strain: Not on file    Food insecurity     Worry: Not on file     Inability: Not on file    Transportation needs     Medical: Not on file     Non-medical: Not on file   Tobacco Use    Smoking status: Never Smoker   Substance and Sexual Activity    Alcohol use: Yes     Frequency: Never    Drug use: Not on file    Sexual activity: Not on file   Lifestyle    Physical activity     Days per week: Not on file     Minutes per session: Not on file    Stress: Not on file   Relationships    Social connections     Talks on phone: Not on file     Gets together: Not on file     Attends Latter day service: Not on file     Active member of club or organization: Not on file     Attends meetings of clubs or organizations: Not on file     Relationship status: Not on file   Other Topics Concern    Not on file   Social History Narrative    Not on file     Current Outpatient Medications on File Prior to Visit   Medication Sig Dispense Refill    ALPRAZolam (XANAX) 0.5 MG tablet Take 0.5 mg by mouth.      L. acidophilus-L.  rhamnosus 15 billion cell Cap Take 1 capsule by mouth.      lamoTRIgine (LAMICTAL) 100 MG tablet Take 1 tablet (100 mg total) by mouth once daily. ABSOLUTELY NO FURTHER REFILL WITHOUT APT 90 tablet 0    levETIRAcetam (KEPPRA) 500 MG Tab Take 1 tablet (500 mg total) by mouth 2 (two) times daily. 60 tablet 11    pyridoxine, vitamin B6, (B-6) 100 MG Tab Take 1 tablet (100 mg total) by mouth once daily. 90 tablet 3     No current facility-administered medications on file prior to visit.      Review of patient's allergies indicates:   Allergen Reactions    Sulfa (sulfonamide antibiotics) Rash       Objective:     Physical Exam   Constitutional: She appears well-developed and well-nourished.       Assessment:     1. Chest pain, moderate coronary artery risk    2. Family history of arteriosclerotic cardiovascular disease    3. Anxiety        Plan:     Chest pain, moderate coronary artery risk    Family history of arteriosclerotic cardiovascular disease    Anxiety      F/u 6 mos/prn

## 2020-10-13 ENCOUNTER — PATIENT MESSAGE (OUTPATIENT)
Dept: NEUROLOGY | Facility: CLINIC | Age: 50
End: 2020-10-13

## 2020-11-04 ENCOUNTER — PATIENT MESSAGE (OUTPATIENT)
Dept: NEUROLOGY | Facility: CLINIC | Age: 50
End: 2020-11-04

## 2020-11-05 RX ORDER — LAMOTRIGINE 100 MG/1
100 TABLET ORAL DAILY
Qty: 35 TABLET | Refills: 0 | Status: SHIPPED | OUTPATIENT
Start: 2020-11-05 | End: 2020-12-08 | Stop reason: SDUPTHER

## 2020-12-08 ENCOUNTER — OFFICE VISIT (OUTPATIENT)
Dept: NEUROLOGY | Facility: CLINIC | Age: 50
End: 2020-12-08
Payer: COMMERCIAL

## 2020-12-08 DIAGNOSIS — G40.309 NONINTRACTABLE GENERALIZED IDIOPATHIC EPILEPSY WITHOUT STATUS EPILEPTICUS: ICD-10-CM

## 2020-12-08 DIAGNOSIS — R42 VERTIGO: Primary | ICD-10-CM

## 2020-12-08 PROCEDURE — 99214 OFFICE O/P EST MOD 30 MIN: CPT | Mod: 95,,, | Performed by: PSYCHIATRY & NEUROLOGY

## 2020-12-08 PROCEDURE — 99214 PR OFFICE/OUTPT VISIT, EST, LEVL IV, 30-39 MIN: ICD-10-PCS | Mod: 95,,, | Performed by: PSYCHIATRY & NEUROLOGY

## 2020-12-08 RX ORDER — LAMOTRIGINE 100 MG/1
100 TABLET ORAL DAILY
Qty: 90 TABLET | Refills: 3 | Status: SHIPPED | OUTPATIENT
Start: 2020-12-08 | End: 2021-12-10

## 2020-12-08 NOTE — PROGRESS NOTES
[facility] - NEUROLOGY EPILEPSY  [facility] [place]    Date: 12/8/20  Patient Name: Katherine Neely   MRN: [id number]   PCP: WESLEY GATES III (Inactive)  Referring Provider: No ref. provider found    Assessment:   Katherine Neely is a 50 y.o. female Presenting in follow-up for epilepsy.  Will continue current Lamictal with no changes.  Patient presenting possible vertigo.  Have recommended ENT for rule out vestibular neuritis/BPPV.  It is possible may be representative vertiginous migraine however would like to see PNG completed.    Problem List Items Addressed This Visit        Neuro    Nonintractable generalized idiopathic epilepsy without status epilepticus    Relevant Medications    lamoTRIgine (LAMICTAL) 100 MG tablet      Other Visit Diagnoses     Vertigo    -  Primary    Relevant Orders    Ambulatory consult to ENT        Marko Alvarez MD  [facility]   Department of Neurology    Patient note was created using mModal Dictation.  Any errors in syntax or even information may not have been identified and edited on initial review prior to signing this note.  Subjective:     HPI:   Ms. Katherine Neely is a 50 y.o. female presenting in follow up for epilepsy. The patient reports that she has had questionable breakthrough seizures since her last visit. She states that she has had brief episodes of staring and another episode of zoning out lasting a second or two with no odell seizure activity or convulsions.  She states she is tolerating the Lamictal well with no side effects.  She continues to experience episodic numbness right side of her head.  She states she is experiencing episodes of feeling like my head is packed full of cotton.  She states she feels unsteady and feels tired.  She states she occasionally notices tinnitus with the episodes and states that if she turns abruptly she experiences significant vertigo.  She does states it is often times associated  a tension-like headache without associated migrainous features.        PAST MEDICAL HISTORY:  Past Medical History:   Diagnosis Date    Anxiety     Anxiety about health     Generalized epilepsy        PAST SURGICAL HISTORY:  No past surgical history on file.    CURRENT MEDS:  Current Outpatient Medications   Medication Sig Dispense Refill    lamoTRIgine (LAMICTAL) 100 MG tablet Take 1 tablet (100 mg total) by mouth once daily. 90 tablet 3     No current facility-administered medications for this visit.        ALLERGIES:  Review of patient's allergies indicates:   Allergen Reactions    Sulfa (sulfonamide antibiotics) Rash       FAMILY HISTORY:  No family history on file.    SOCIAL HISTORY:  Social History     Tobacco Use    Smoking status: Never Smoker   Substance Use Topics    Alcohol use: Yes     Frequency: Never    Drug use: Not on file       Review of Systems:  12 review of systems is negative except for the symptoms mentioned in HPI.      Objective:     There were no vitals filed for this visit.  General: NAD, well nourished   Eyes: no tearing, discharge, no erythema   ENT: moist mucous membranes of the oral cavity, nares patent    Neck: Supple, full range of motion  Cardiovascular: Appears well perfused  Lungs: Normal work of breathing, normal chest wall excursions  Skin: No rash, lesions, or breakdown on exposed skin  Psychiatry: Mood and affect are appropriate   Abdomen: nondistended, non tender  Extremeties: No cyanosis, clubbing or edema visible    Neurological   MENTAL STATUS: Alert and oriented to person, place, and time. Attention and concentration within normal limits. Speech without dysarthria. Recent and remote memory within normal limits   CRANIAL NERVES: Visual fields intact. PERRL. EOMI. Facial sensation intact. Face symmetrical. Hearing grossly intact. Full shoulder shrug bilaterally. Tongue protrudes midline   SENSORY: Sensation is intact to light touch throughout.    MOTOR: Normal bulk.  Moves all extremities symmetrically   REFLEXES: Deferred due to virtual visit  CEREBELLAR/COORDINATION/GAIT: Gait steady with normal arm swing and stride length.  Finger to nose intact. Normal rapid alternating movements.

## 2020-12-10 ENCOUNTER — TELEPHONE (OUTPATIENT)
Dept: NEUROLOGY | Facility: CLINIC | Age: 50
End: 2020-12-10

## 2020-12-11 ENCOUNTER — TELEPHONE (OUTPATIENT)
Dept: NEUROLOGY | Facility: CLINIC | Age: 50
End: 2020-12-11